# Patient Record
(demographics unavailable — no encounter records)

---

## 2025-03-26 NOTE — PHYSICAL EXAM
[Sclera] : the sclera and conjunctiva were normal [Neck Appearance] : the appearance of the neck was normal [] : no respiratory distress [Respiration, Rhythm And Depth] : normal respiratory rhythm and effort [Exaggerated Use Of Accessory Muscles For Inspiration] : no accessory muscle use [Auscultation Breath Sounds / Voice Sounds] : lungs were clear to auscultation bilaterally [Apical Impulse] : the apical impulse was normal [Heart Rate And Rhythm] : heart rate was normal and rhythm regular [Heart Sounds] : normal S1 and S2 [Murmurs] : no murmurs [Examination Of The Chest] : the chest was normal in appearance [Chest Visual Inspection Thoracic Asymmetry] : no chest asymmetry [1+] : left 1+ [Abnormal Walk] : normal gait [Skin Color & Pigmentation] : normal skin color and pigmentation [Skin Turgor] : normal skin turgor [Oriented To Time, Place, And Person] : oriented to person, place, and time [Impaired Insight] : insight and judgment were intact [Affect] : the affect was normal

## 2025-04-02 NOTE — PHYSICAL EXAM
[] : no respiratory distress [Respiration, Rhythm And Depth] : normal respiratory rhythm and effort [Exaggerated Use Of Accessory Muscles For Inspiration] : no accessory muscle use [Auscultation Breath Sounds / Voice Sounds] : lungs were clear to auscultation bilaterally [Apical Impulse] : the apical impulse was normal [Heart Rate And Rhythm] : heart rate was normal and rhythm regular [Heart Sounds] : normal S1 and S2 [Clean] : clean [Dry] : dry [Healing Well] : healing well [___ +] : bilateral ankle [unfilled]U+ pitting edema [FreeTextEntry3] :  All Review of Systems Negative.

## 2025-04-02 NOTE — COUNSELING
[Hygeine (Including Daily Shower)] : hygeine (including daily shower) [Importance of Regular Medical Follow-Up] : the importance of regular medical follow-up [No Heavy Lifting] : no heavy lifting (>15-20 lb. for 1 month or 25 lb. for 3 months from date of surgery) [Blood Pressure Control] : blood pressure control [S/S of infection] : signs and symptoms of infection (and to whom it should be reported) [Progressive Ambulation/Activity] : progressive ambulation/activity [Medication/Vitamin/Herb/Food Interaction] : medication/vitamin/herb/food interaction [SBE antibiotic prophylaxis] : SBE antibiotic prophylaxis was recommended [FreeTextEntry1] : Post-op Transthoracic Echocardiogram 3/21/25 1. Technically difficult image quality.  2. Left ventricular systolic function is normal with an ejection fraction of 59 % by Moreland's method of disks with an ejection fraction visually estimated at 55 to 60 %.  3. Mild left ventricular hypertrophy.  4. Normal right ventricular cavity size and normal right ventricular systolic function.  5. Estimated pulmonary artery systolic pressure is 46 mmHg, consistent with echocardiographic evidence of pulmonary hyptertension.  6. Device lead is visualized in the right heart.  7. Left atrium is mildly dilated.  8. The right atrium is normal in size.  9. There is mild calcification of the mitral valve annulus. 10. Mild mitral regurgitation. 11. Mild to moderate tricuspid regurgitation. 12. 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 No intravalvular or paravalvular regurgitation, mean gradient 5 mmHg. 13. No pericardial effusion seen. 14. Compared to the transthoracic echocardiogram performed on 1/31/2025, interval TAVR done for AV stenosis.

## 2025-04-02 NOTE — REASON FOR VISIT
[de-identified] : Percutaneous Right Common Femoral Artery Transcatheter Aortic Valve Replacement with 27mm Navitor Vision [de-identified] : 3/21/25 [de-identified] : Postop course significant for prolonged O2 requirement, improved with PO diuretics (pt was discharged on NIV and home O2 on prior admission and returned equipment because he did not feel likely he needed). Oxygen weaned off, also with ARTHUR on CKD, followed by renal, PO bumex dose adjusted, to have repeat labs as outpatient.  Eliquis resumed at half dose due to renal function, repeat labs to be done as outpatient and Eliquis dose to be reassessed by office team.   He is here today and states overall he feels good, "really good".  He is progressing his activity each day without question starting Saturday.

## 2025-04-02 NOTE — ASSESSMENT
[FreeTextEntry1] : Mr. OLIVAREZ presents today for his postoperative follow up appointment; he is status post Transcatheter Aortic Valve Replacement.   Physical Exam was as noted above. He has a PPM so no MCOT needed.   During the visit, we discussed the importance of increasing activity and exercise as tolerated. We discussed the need for antibiotic prophylaxis with procedures such as dental work and colonoscopy or endoscopy. We recommend waiting a full six months before undergoing any dental procedure or cleaning.    Overall, I am pleased with Mr. OLIVAREZ's progress postoperatively. I am recommending that he continue follow up care with Cardiology and Primary Care Provider; he will return to care in office as needed. All questions answered, he verbalizes understanding.   We reviewed his lab work from Monday and Cr= 1.82 we will currently maintain the renal dose of Eliquis 2.5mg BID.   Also, we discussed timing of Bumex since its 1mg BID he can try taking his 2nd dose around 2pm so it isn't too late into the evening.    PLAN: - Follow up Cardiology, Dr Carroll  - CBC/BMP 30 Day Follow Up - Transthoracic Echocardiogram 30 Day Follow Up - EKG 30 Day Follow Up